# Patient Record
Sex: MALE | Race: WHITE | Employment: FULL TIME | URBAN - METROPOLITAN AREA
[De-identification: names, ages, dates, MRNs, and addresses within clinical notes are randomized per-mention and may not be internally consistent; named-entity substitution may affect disease eponyms.]

---

## 2019-04-17 ENCOUNTER — APPOINTMENT (OUTPATIENT)
Dept: CT IMAGING | Facility: HOSPITAL | Age: 49
End: 2019-04-17
Payer: COMMERCIAL

## 2019-04-17 PROCEDURE — 72125 CT NECK SPINE W/O DYE: CPT

## 2019-04-17 PROCEDURE — 70450 CT HEAD/BRAIN W/O DYE: CPT

## 2019-04-18 NOTE — ED NOTES
Patient arrives saturated in urine, attempting to strike staff. Security at bedside. Patient attempting to pull off c-collar and get out of bed. Patient to CT with security, while in CT, patient striking at staff, yelling at staff, and spitting on staff.  P

## 2019-04-18 NOTE — ED NOTES
Spoke with patient's cousin and father, father Jaguar Mcmillan took patient's pants, belt, underwear and socks which were saturated with urine.  Updated on plan of care, family sent home per MD. Jaguar Costanicole, father, states he will be able to give patient a ride home when he is

## 2019-04-18 NOTE — ED PROVIDER NOTES
Patient Seen in: [facility] Emergency Department    History   Patient presents with:  Alcohol Intoxication (neurologic)    Stated Complaint: etoh    HPI    25-EBKT-AYP alcoholic is in the emergency department by paramedics after he was found having Mucus membranes pink and moist,   no stridor or trismus, dentition is unremarkable    Neck: Supple with normal range of motion. No midline cervical thoracic or lumbosacral spine tenderness. Clavicles and rib cage are nontender.   No lymphadenopathy, n tests on the individual orders.    RAINBOW DRAW BLUE   RAINBOW DRAW LAVENDER   RAINBOW DRAW LIGHT GREEN   RAINBOW DRAW GOLD   CBC W/ DIFFERENTIAL          A total of 60 minutes of critical care time (exclusive of billable procedures) was administered to man abnormality identified. 2. Retained metallic foreign body within the soft tissues of the right upper lip measuring up to 4 mm.     Dictated by: Lizette Burnett MD on 4/17/2019 at 22:55     Approved by: Lizette Burnett MD            Ct Spine Cervical (cpt=7 was screened and evaluated during this visit. As a treating physician attending to the patient, I determined, within reasonable clinical confidence and prior to discharge, that an emergency medical condition was not or was no longer present.   There was n

## 2019-04-18 NOTE — ED INITIAL ASSESSMENT (HPI)
Patient intoxicated, friend states he went out to use washroom and patient fell over. Unsure if he hit head.

## 2019-04-18 NOTE — ED NOTES
Awake @ times still drowsy, kept insisting of calling spouse, reminded about time of day & was advised that call be placed later in the day

## 2019-04-18 NOTE — ED NOTES
More alert & coherent, able to follow direction, nobehavioral issues noted. Ambulatory to washroom with security & staff.  Hard restrained will be d/cd if no further behavioral issues noted

## 2019-04-18 NOTE — ED PROVIDER NOTES
Patient has been resting comfortably throughout the day. At one point he did require Ativan but none for the past 5 to 6 hours. He would still like to talk to someone about alcohol detox.   Crisis evaluation is very backed up but will likely get to him ar

## 2021-07-04 ENCOUNTER — APPOINTMENT (OUTPATIENT)
Dept: CT IMAGING | Facility: HOSPITAL | Age: 51
End: 2021-07-04
Attending: EMERGENCY MEDICINE
Payer: COMMERCIAL

## 2021-07-04 PROCEDURE — 72125 CT NECK SPINE W/O DYE: CPT | Performed by: EMERGENCY MEDICINE

## 2021-07-04 PROCEDURE — 70450 CT HEAD/BRAIN W/O DYE: CPT | Performed by: EMERGENCY MEDICINE

## 2021-07-04 NOTE — ED QUICK NOTES
Patient pulling at C collar, multiple attempts to stop patient unsuccessful.  Pt ripped off C collar and attempted to get out of bed  Staff and security at bedside

## 2021-07-04 NOTE — ED PROVIDER NOTES
Patient Seen in: [facility] Emergency Department      History   Patient presents with:  Eval-D    Stated Complaint: etoh, fell hit head no LOC per EMS     HPI/Subjective:   HPI    Patient is a 62-year-old male, history of alcohol abuse, also rectal Normal voice. Cardiovascular: Normal rate and regular rhythm. Pulmonary/Chest: Effort normal and breath sounds normal. No stridor. No tenderness of the chest wall. There is no crepitus. Abdominal: Soft. There is no tenderness.  There is no guarding noted on EKG Report. Rate: 88  Rhythm: Sinus Rhythm  Reading: Normal sinus rhythm with prolonged QT. Blood work notable for high alcohol level, low bicarb likely from dehydration and alcoholic ketoacidosis.       I have personally visualized t which includes the Dose Index Registry. PATIENT STATED HISTORY: (As transcribed by Technologist)  ETOH, patient fell and hit head, unknown location of head injury. FINDINGS:  Vertebral height is normal.  A fracture is not identified.   Atlantoaxial rela speaking, moving all extremities but not appropriate for discharge. Signed out.                       Disposition and Plan     Clinical Impression:  Alcoholic intoxication without complication ([facility] [address]  (primary encounter diagnosis)  Contusion of head, uns

## 2021-07-04 NOTE — ED QUICK NOTES
Pt breathing through mouth, O2 stats dropped to 85% with a good wave form. Pt placed on non-rebreather at 15L with O2 stats at 99%.

## 2021-07-05 NOTE — ED QUICK NOTES
Contacted wife for ride home, no answer. Per Dr Owen Sammamish pt to sleep until 0600 and contact wife for a ride home.

## 2021-07-05 NOTE — ED QUICK NOTES
Attempted to ambulate patient, unsteady on feet  Pt back in bed, calm and cooperative will try again in a couple of hours

## 2021-07-05 NOTE — ED PROVIDER NOTES
Clinically sober at this time. Ambulating well. Pt's wife at bedside requesting to take patient home. As discussed with Dr. Durga Rivera, pt to be d/c'd when ambulating/clinically sober.

## 2021-09-20 ENCOUNTER — APPOINTMENT (OUTPATIENT)
Dept: CT IMAGING | Facility: HOSPITAL | Age: 51
End: 2021-09-20
Attending: EMERGENCY MEDICINE
Payer: COMMERCIAL

## 2021-09-20 PROCEDURE — 70450 CT HEAD/BRAIN W/O DYE: CPT | Performed by: EMERGENCY MEDICINE

## 2021-09-20 NOTE — ED PROVIDER NOTES
Patient Seen in: [facility] Emergency Department      History   Patient presents with:  Eval-P    Stated Complaint: ETOH    Subjective:   HPI  The patient arrives via EMS from home patient's family states that the patient has been has a drinking pot has never been admitted for a drinking problem. He has never had any DTs. A the patient is in no respiratory distress. HEENT: Atraumatic, conjunctiva are not pale. There is no icterus. Oral mucosa Is wet. No facial trauma. The neck is supple.   No si Abnormality         Status                     ---------                               -----------         ------                     CBC W/ DIFFERENTIAL[[id number]]          Abnormal            Final result                 Please view results fo caliber, stable. Allowing for age this is considered mild generalized atrophy. No new mass effect or midline shift. Stable gray-white matter differentiation. No abnormal extra-axial fluid collection. No acute intracranial hemorrhage.   The visualized p

## 2021-09-20 NOTE — ED INITIAL ASSESSMENT (HPI)
Pt arrives via EMS from home, patient's family states patient has drinking problem and has been drinking today, last drink 2PM per patient. They spoke with Wilfrido Branch, wanted him to be admitted.  When they were transferring patient to the car, patient fell

## 2021-09-20 NOTE — ED QUICK NOTES
Pt placed on 1.5L/min of oxygen via nasal cannula due to low oxygen saturation. Oxygen saturation came up.

## 2021-09-21 NOTE — PROGRESS NOTES
09/21/21 0208   COVID Exposure Risk Screening   Have you been practicing social distancing? Yes   Have you been wearing a mask when in the community?  Yes  (He hasn't had the Covid vaccine.)   Are the people you live with following social distancing and

## 2021-09-21 NOTE — BH LEVEL OF CARE ASSESSMENT
Crisis Evaluation Assessment    Braxton Bi YOB: 1970   Age 45year old MRN [id number]   Location [address] [place] [address] Attending Zepeda Lake, MD      Patient's legal sex: male  Patient identifies as: male  Patient' called [facility] and planned to go there on 9/21 for an assessment. She wants to get him help at [facility] like his brother did. Adelina Shah fell down while on the phone with [facility] and [facility] told her to take him to the ER.      She states he says he's depressed and he cries wh kids  Past Suicidal Ideation: Denies            Family History or Personal Lived Experience of Loss or Near Loss by Suicide: Denies                      Non-Suicidal Self-Injury:   Chuckie Carreno denies hx of SIB            Access to Means:  Access to Means  Has of withdrawal sx's or seizures or hallucinations. He denies drug use. His drug screen was negative. Functional Achievement:   Geri Dos Santos works full-time as an . He denies issues at work.  He states he usually drinks in the afternoon Appropriate  Intensity of Volume: Ordinary  Clarity: Clear  Cognition  Concentration: Unimpaired  Memory: Recent memory intact;  Remote memory intact  Orientation Level: Oriented X4  Insight: Fair  Fair/poor insight as evidenced by: Phyllisse Trotter states he's will retiring and moving out of state or they are passing away from cancer. She states that he used to drink at night before bed but for the past 3 weeks has been drinking during the day. He works full-time as an  and denies issues with his job.  He sta

## 2021-09-21 NOTE — ED QUICK NOTES
Reviewed discharge paperwork with patient, verbalized understanding. Pt is leaving to home with wife, ambulatory with steady gait, in no distress, and is stable at this time.

## 2022-04-14 PROBLEM — F10.20 ALCOHOL USE DISORDER, SEVERE, DEPENDENCE (HCC): Status: ACTIVE | Noted: 2022-04-14

## 2022-04-15 PROBLEM — F41.9 ANXIETY DISORDER, UNSPECIFIED: Status: ACTIVE | Noted: 2022-04-15

## 2022-04-15 PROBLEM — F33.2 MAJOR DEPRESSIVE DISORDER, RECURRENT SEVERE WITHOUT PSYCHOTIC FEATURES (HCC): Status: ACTIVE | Noted: 2022-04-15

## 2022-06-07 ENCOUNTER — APPOINTMENT (OUTPATIENT)
Dept: GENERAL RADIOLOGY | Facility: HOSPITAL | Age: 52
End: 2022-06-07
Attending: EMERGENCY MEDICINE
Payer: MEDICAID

## 2022-06-07 ENCOUNTER — APPOINTMENT (OUTPATIENT)
Dept: CT IMAGING | Facility: HOSPITAL | Age: 52
End: 2022-06-07
Attending: EMERGENCY MEDICINE
Payer: MEDICAID

## 2022-06-07 PROCEDURE — 70450 CT HEAD/BRAIN W/O DYE: CPT | Performed by: EMERGENCY MEDICINE

## 2022-06-07 PROCEDURE — 71045 X-RAY EXAM CHEST 1 VIEW: CPT | Performed by: EMERGENCY MEDICINE

## 2022-06-08 NOTE — ED QUICK NOTES
Patient left home with family.  Left without discharge paperwork after speaking with [facility] staff

## 2022-06-08 NOTE — ED INITIAL ASSESSMENT (HPI)
Pt presented to ED intoxicated. Non responsive. Requiring sternal rubs to arouse.  Prior admission for etoh

## 2022-06-08 NOTE — ED NOTES
Writer attempted assessment with pt, but he declined. Writer performed [employer] which is 0. Pt denied HI/psychosis.

## 2023-01-31 ENCOUNTER — HOSPITAL ENCOUNTER (EMERGENCY)
Facility: HOSPITAL | Age: 53
Discharge: HOME | End: 2023-01-31
Attending: EMERGENCY MEDICINE
Payer: MEDICAID

## 2023-01-31 VITALS
HEART RATE: 99 BPM | DIASTOLIC BLOOD PRESSURE: 86 MMHG | OXYGEN SATURATION: 97 % | RESPIRATION RATE: 17 BRPM | SYSTOLIC BLOOD PRESSURE: 121 MMHG | TEMPERATURE: 99 F

## 2023-01-31 DIAGNOSIS — F10.939 ALCOHOL WITHDRAWAL SYNDROME WITH COMPLICATION (HCC): Primary | ICD-10-CM

## 2023-01-31 DIAGNOSIS — D69.6 THROMBOCYTOPENIA (HCC): ICD-10-CM

## 2023-01-31 DIAGNOSIS — E86.0 DEHYDRATION: ICD-10-CM

## 2023-01-31 LAB
ALBUMIN SERPL-MCNC: 3.6 G/DL (ref 3.4–5)
ALBUMIN/GLOB SERPL: 0.8 {RATIO} (ref 1–2)
ALP LIVER SERPL-CCNC: 82 U/L
ALT SERPL-CCNC: 98 U/L
ANION GAP SERPL CALC-SCNC: 18 MMOL/L (ref 0–18)
AST SERPL-CCNC: 159 U/L (ref 15–37)
BASOPHILS # BLD AUTO: 0.03 X10(3) UL (ref 0–0.2)
BASOPHILS NFR BLD AUTO: 0.4 %
BILIRUB SERPL-MCNC: 1.4 MG/DL (ref 0.1–2)
BUN BLD-MCNC: 10 MG/DL (ref 7–18)
CALCIUM BLD-MCNC: 8.9 MG/DL (ref 8.5–10.1)
CHLORIDE SERPL-SCNC: 99 MMOL/L (ref 98–112)
CO2 SERPL-SCNC: 16 MMOL/L (ref 21–32)
CREAT BLD-MCNC: 0.67 MG/DL
EOSINOPHIL # BLD AUTO: 0.02 X10(3) UL (ref 0–0.7)
EOSINOPHIL NFR BLD AUTO: 0.3 %
ERYTHROCYTE [DISTWIDTH] IN BLOOD BY AUTOMATED COUNT: 14.6 %
ETHANOL SERPL-MCNC: 171 MG/DL (ref ?–3)
GFR SERPLBLD BASED ON 1.73 SQ M-ARVRAT: 112 ML/MIN/1.73M2 (ref 60–?)
GLOBULIN PLAS-MCNC: 4.3 G/DL (ref 2.8–4.4)
GLUCOSE BLD-MCNC: 127 MG/DL (ref 70–99)
GLUCOSE BLD-MCNC: 69 MG/DL (ref 70–99)
HCT VFR BLD AUTO: 42.1 %
HGB BLD-MCNC: 14.8 G/DL
IMM GRANULOCYTES # BLD AUTO: 0.05 X10(3) UL (ref 0–1)
IMM GRANULOCYTES NFR BLD: 0.6 %
LIPASE SERPL-CCNC: 275 U/L (ref 73–393)
LYMPHOCYTES # BLD AUTO: 0.25 X10(3) UL (ref 1–4)
LYMPHOCYTES NFR BLD AUTO: 3.1 %
MAGNESIUM SERPL-MCNC: 1.8 MG/DL (ref 1.6–2.6)
MCH RBC QN AUTO: 30.6 PG (ref 26–34)
MCHC RBC AUTO-ENTMCNC: 35.2 G/DL (ref 31–37)
MCV RBC AUTO: 87 FL
MONOCYTES # BLD AUTO: 0.49 X10(3) UL (ref 0.1–1)
MONOCYTES NFR BLD AUTO: 6.1 %
NEUTROPHILS # BLD AUTO: 7.13 X10 (3) UL (ref 1.5–7.7)
NEUTROPHILS # BLD AUTO: 7.13 X10(3) UL (ref 1.5–7.7)
NEUTROPHILS NFR BLD AUTO: 89.5 %
OSMOLALITY SERPL CALC.SUM OF ELEC: 273 MOSM/KG (ref 275–295)
PLATELET # BLD AUTO: 86 10(3)UL (ref 150–450)
POTASSIUM SERPL-SCNC: 3.7 MMOL/L (ref 3.5–5.1)
PROT SERPL-MCNC: 7.9 G/DL (ref 6.4–8.2)
RBC # BLD AUTO: 4.84 X10(6)UL
SODIUM SERPL-SCNC: 133 MMOL/L (ref 136–145)
WBC # BLD AUTO: 8 X10(3) UL (ref 4–11)

## 2023-01-31 PROCEDURE — 82962 GLUCOSE BLOOD TEST: CPT

## 2023-01-31 PROCEDURE — 80053 COMPREHEN METABOLIC PANEL: CPT | Performed by: EMERGENCY MEDICINE

## 2023-01-31 PROCEDURE — 96361 HYDRATE IV INFUSION ADD-ON: CPT

## 2023-01-31 PROCEDURE — 99285 EMERGENCY DEPT VISIT HI MDM: CPT

## 2023-01-31 PROCEDURE — 85025 COMPLETE CBC W/AUTO DIFF WBC: CPT | Performed by: EMERGENCY MEDICINE

## 2023-01-31 PROCEDURE — 83690 ASSAY OF LIPASE: CPT | Performed by: EMERGENCY MEDICINE

## 2023-01-31 PROCEDURE — 96374 THER/PROPH/DIAG INJ IV PUSH: CPT

## 2023-01-31 PROCEDURE — 83735 ASSAY OF MAGNESIUM: CPT | Performed by: EMERGENCY MEDICINE

## 2023-01-31 PROCEDURE — 99291 CRITICAL CARE FIRST HOUR: CPT

## 2023-01-31 PROCEDURE — 82077 ASSAY SPEC XCP UR&BREATH IA: CPT | Performed by: EMERGENCY MEDICINE

## 2023-01-31 PROCEDURE — 96376 TX/PRO/DX INJ SAME DRUG ADON: CPT

## 2023-01-31 RX ORDER — LORAZEPAM 2 MG/ML
1 INJECTION INTRAMUSCULAR ONCE
Status: DISCONTINUED | OUTPATIENT
Start: 2023-01-31 | End: 2023-01-31

## 2023-01-31 RX ORDER — LORAZEPAM 2 MG/ML
1 INJECTION INTRAMUSCULAR ONCE
Status: COMPLETED | OUTPATIENT
Start: 2023-01-31 | End: 2023-01-31

## 2023-01-31 RX ORDER — LORAZEPAM 1 MG/1
1 TABLET ORAL EVERY 6 HOURS PRN
Qty: 5 TABLET | Refills: 0 | Status: SHIPPED | OUTPATIENT
Start: 2023-01-31 | End: 2023-02-07

## 2023-01-31 NOTE — DISCHARGE INSTRUCTIONS
Please follow up with [facility] ([phone]), 911, or the nearest emergency room if symptoms persist or worsen, or with any questions or concerns. Please also follow up with the referrals below and/or detox as soon as possible for continued treatment. [facility] Suicide Prevention Lifeline  1-201-000-TALK (3671)  Chat service available at www.suicidepreventionlifeline. org    Residential and Outpatient Substance and Alcohol Abuse 5002 [facility] 10   [address] [place], [address] [place]  [phone]    [facility]  [address], [facility] 421  [phone]  -OR-  [address], [address] [place] [address]    [facility]   [address], [address] [place]  [phone]    [facility]  [place] [address]  [phone]    [address]  [phone]    [facility]   [address] 87  [phone]    [facility]   845 [facility], [address]  [phone]    8479 [facility] 143  [address]  [phone] to [address] [place] [address]  [address], Residence Tariq [address]01  [phone]    [facility] [place]   [address] [facility], [facility] [address]  [phone]    [address]  [address] [address], [place], [address] [place] [address]  [phone]    [address]  [address], Yves, [address] [place] [address]  [phone]    Pari Substance Use [address] [facility]  [address] Hendrix, [address]  [phone]    [facility] [address], [address] [place] [address], [address] [place] [address]  [phone]  1310 [facility] [address], [place], [place] [address]  311  [facility]note 3], [facility] 3970  [address]   [address] [place] [address]  [phone]    Top of the [facility]  [address], [place], 175 [facility]  [phone]    Detox (all subject to insurance barriers)    [facility] Naomi  [phone]    [facility]  [address] 5th floor, [facility], 2601 [facility],Fourth Floor  [phone]    4306 [facility]  295 [facility], [facility] 9  [phone]    [facility]  6977 [facility], [facility]  [phone]    [facility]  [address]  [phone]    700 [facility],2Nd Floor [address] [facility] [address] Deejaypolina [place],  [address]  [phone]    [facility] [address] Amaya, [place] [address]  [place] at [facility], 1220 [facility]  [phone]    [facility]  [address] [place] [address]  [phone]  [address] [place] [address] [address]  [phone] at [facility], 300 [facility]  [phone]    [facility]  [address] [place] [address] [address]  [phone]    Top of the [facility]  [address], [place], 175 [facility]  [phone]    [address]  [phone]    Therapy/Counseling Services  Marilyn Cuellar (Psychologist);  Della Hernandez (Therapist)  [facility]  [facility] [address] [place], [place], [address] [phone]    Shelby Barahona (Counselor)  [place] of [address], [address], [address] [phone]    Negro Haines (Therapist)  [address]  [address] [place], [place], [address]  [phone]    Harrison Clark (Therapist)  [address] [place], [place], [address]  [phone]    Anuja Galeano (Therapist)  [facility]  [address] [facility], [address] [place], [place], [address]  [phone]    [facility] (Therapist)  [facility]  4015 [facility], [facility] [address] [place] [address], [address]  [phone]      Psychiatry [address] Department  [address], [place], [place], [address]15Th Floor, [facility] [address] [place] [address], [address], [place], [place], [address], [place], [address]  [phone]    [facility]  [address], [place], [place]  [phone]    [facility]  [address], [place], [address] [phone]

## 2023-01-31 NOTE — ED NOTES
Pt declined consult or consult with  but was open to referrals for detox programs. Pt reported \"possible\" interest in detox programming. Encouraged pt to follow up with [facility], as site does accept pt insurance. Pt confirmed understanding. Also encouraged pt to follow up with this writer if he needs anything further.

## 2023-01-31 NOTE — ED QUICK NOTES
Patient provided with outpatient referrals per  Johanna Nazario. Patient ambulating out of ED with steady gait, states father will be ride home.

## 2023-01-31 NOTE — ED PROVIDER NOTES
Received patient in signout from Dr. Silvano Benitez. He has been resting comfortably, he met with Leopoldo Old crisis worker, they have given him referrals for where he can get help. He feels well enough to go. No SI HI or other issues.

## 2023-01-31 NOTE — ED INITIAL ASSESSMENT (HPI)
Patient brought in by EMS. Per patient about a week ago, he started binge drinking and last drink was around 11am this morning. Earlier in the day pt started experiencing N/V, shakiness, and anxiety. At 10pm there was a stressful event with his daughter that seemed to escalate his symptoms.

## 2023-03-13 ENCOUNTER — HOSPITAL ENCOUNTER (EMERGENCY)
Facility: HOSPITAL | Age: 53
Discharge: HOME | End: 2023-03-13
Attending: EMERGENCY MEDICINE
Payer: MEDICAID

## 2023-03-13 VITALS
SYSTOLIC BLOOD PRESSURE: 139 MMHG | RESPIRATION RATE: 19 BRPM | HEIGHT: 73 IN | OXYGEN SATURATION: 97 % | WEIGHT: 195 LBS | TEMPERATURE: 97 F | DIASTOLIC BLOOD PRESSURE: 93 MMHG | HEART RATE: 109 BPM | BODY MASS INDEX: 25.84 KG/M2

## 2023-03-13 DIAGNOSIS — F10.930 ALCOHOL WITHDRAWAL SYNDROME WITHOUT COMPLICATION (HCC): Primary | ICD-10-CM

## 2023-03-13 LAB
ALBUMIN SERPL-MCNC: 3.6 G/DL (ref 3.4–5)
ALBUMIN/GLOB SERPL: 0.8 {RATIO} (ref 1–2)
ALP LIVER SERPL-CCNC: 91 U/L
AMPHET UR QL SCN: NEGATIVE
ANION GAP SERPL CALC-SCNC: 14 MMOL/L (ref 0–18)
BASOPHILS # BLD AUTO: 0.03 X10(3) UL (ref 0–0.2)
BASOPHILS NFR BLD AUTO: 0.2 %
BENZODIAZ UR QL SCN: NEGATIVE
BILIRUB SERPL-MCNC: 0.8 MG/DL (ref 0.1–2)
BILIRUB UR QL STRIP.AUTO: NEGATIVE
BUN BLD-MCNC: 14 MG/DL (ref 7–18)
CALCIUM BLD-MCNC: 8.5 MG/DL (ref 8.5–10.1)
CANNABINOIDS UR QL SCN: NEGATIVE
CHLORIDE SERPL-SCNC: 102 MMOL/L (ref 98–112)
CLARITY UR REFRACT.AUTO: CLEAR
CO2 SERPL-SCNC: 20 MMOL/L (ref 21–32)
COCAINE UR QL: NEGATIVE
COLOR UR AUTO: YELLOW
CREAT BLD-MCNC: 0.72 MG/DL
CREAT UR-SCNC: 129 MG/DL
EOSINOPHIL # BLD AUTO: 0.01 X10(3) UL (ref 0–0.7)
EOSINOPHIL NFR BLD AUTO: 0.1 %
ERYTHROCYTE [DISTWIDTH] IN BLOOD BY AUTOMATED COUNT: 14.7 %
ETHANOL SERPL-MCNC: 260 MG/DL (ref ?–3)
GFR SERPLBLD BASED ON 1.73 SQ M-ARVRAT: 109 ML/MIN/1.73M2 (ref 60–?)
GLOBULIN PLAS-MCNC: 4.8 G/DL (ref 2.8–4.4)
GLUCOSE BLD-MCNC: 63 MG/DL (ref 70–99)
GLUCOSE UR STRIP.AUTO-MCNC: NEGATIVE MG/DL
HCT VFR BLD AUTO: 46.1 %
HGB BLD-MCNC: 15.9 G/DL
IMM GRANULOCYTES # BLD AUTO: 0.04 X10(3) UL (ref 0–1)
IMM GRANULOCYTES NFR BLD: 0.3 %
KETONES UR STRIP.AUTO-MCNC: 80 MG/DL
LYMPHOCYTES # BLD AUTO: 0.41 X10(3) UL (ref 1–4)
LYMPHOCYTES NFR BLD AUTO: 3.1 %
MCH RBC QN AUTO: 30.7 PG (ref 26–34)
MCHC RBC AUTO-ENTMCNC: 34.5 G/DL (ref 31–37)
MCV RBC AUTO: 89 FL
MDMA UR QL SCN: NEGATIVE
MONOCYTES # BLD AUTO: 0.29 X10(3) UL (ref 0.1–1)
MONOCYTES NFR BLD AUTO: 2.2 %
NEUTROPHILS # BLD AUTO: 12.3 X10 (3) UL (ref 1.5–7.7)
NEUTROPHILS # BLD AUTO: 12.3 X10(3) UL (ref 1.5–7.7)
NEUTROPHILS NFR BLD AUTO: 94.1 %
NITRITE UR QL STRIP.AUTO: NEGATIVE
OPIATES UR QL SCN: NEGATIVE
OSMOLALITY SERPL CALC.SUM OF ELEC: 281 MOSM/KG (ref 275–295)
OXYCODONE UR QL SCN: NEGATIVE
PH UR STRIP.AUTO: 5 [PH] (ref 5–8)
PLATELET # BLD AUTO: 179 10(3)UL (ref 150–450)
PROT SERPL-MCNC: 8.4 G/DL (ref 6.4–8.2)
PROT UR STRIP.AUTO-MCNC: >=500 MG/DL
RBC # BLD AUTO: 5.18 X10(6)UL
SARS-COV-2 RNA RESP QL NAA+PROBE: NOT DETECTED
SODIUM SERPL-SCNC: 136 MMOL/L (ref 136–145)
SP GR UR STRIP.AUTO: 1.02 (ref 1–1.03)
UROBILINOGEN UR STRIP.AUTO-MCNC: <2 MG/DL
WBC # BLD AUTO: 13.1 X10(3) UL (ref 4–11)

## 2023-03-13 PROCEDURE — 80307 DRUG TEST PRSMV CHEM ANLYZR: CPT | Performed by: EMERGENCY MEDICINE

## 2023-03-13 PROCEDURE — 85025 COMPLETE CBC W/AUTO DIFF WBC: CPT | Performed by: EMERGENCY MEDICINE

## 2023-03-13 PROCEDURE — 96374 THER/PROPH/DIAG INJ IV PUSH: CPT | Performed by: EMERGENCY MEDICINE

## 2023-03-13 PROCEDURE — 87086 URINE CULTURE/COLONY COUNT: CPT | Performed by: EMERGENCY MEDICINE

## 2023-03-13 PROCEDURE — 80053 COMPREHEN METABOLIC PANEL: CPT | Performed by: EMERGENCY MEDICINE

## 2023-03-13 PROCEDURE — 99284 EMERGENCY DEPT VISIT MOD MDM: CPT | Performed by: EMERGENCY MEDICINE

## 2023-03-13 PROCEDURE — 82077 ASSAY SPEC XCP UR&BREATH IA: CPT | Performed by: EMERGENCY MEDICINE

## 2023-03-13 PROCEDURE — 96376 TX/PRO/DX INJ SAME DRUG ADON: CPT | Performed by: EMERGENCY MEDICINE

## 2023-03-13 PROCEDURE — 96361 HYDRATE IV INFUSION ADD-ON: CPT | Performed by: EMERGENCY MEDICINE

## 2023-03-13 PROCEDURE — 81001 URINALYSIS AUTO W/SCOPE: CPT | Performed by: EMERGENCY MEDICINE

## 2023-03-13 PROCEDURE — 99285 EMERGENCY DEPT VISIT HI MDM: CPT

## 2023-03-13 RX ORDER — ONDANSETRON 4 MG/1
4 TABLET, ORALLY DISINTEGRATING ORAL ONCE
Status: COMPLETED | OUTPATIENT
Start: 2023-03-13 | End: 2023-03-13

## 2023-03-13 RX ORDER — LORAZEPAM 1 MG/1
1 TABLET ORAL 2 TIMES DAILY PRN
Qty: 20 TABLET | Refills: 0 | Status: SHIPPED | OUTPATIENT
Start: 2023-03-13 | End: 2023-03-20

## 2023-03-13 RX ORDER — LORAZEPAM 1 MG/1
1 TABLET ORAL EVERY 30 MIN PRN
Status: DISCONTINUED | OUTPATIENT
Start: 2023-03-13 | End: 2023-03-13

## 2023-03-13 RX ORDER — LORAZEPAM 2 MG/ML
1 INJECTION INTRAMUSCULAR EVERY 30 MIN PRN
Status: DISCONTINUED | OUTPATIENT
Start: 2023-03-13 | End: 2023-03-13

## 2023-03-13 RX ORDER — LORAZEPAM 2 MG/ML
1 INJECTION INTRAMUSCULAR ONCE
Status: COMPLETED | OUTPATIENT
Start: 2023-03-13 | End: 2023-03-13

## 2023-03-13 NOTE — ED INITIAL ASSESSMENT (HPI)
Pt presents to ed ambulatory with steady gait c/o alcohol withdrawal symptoms, states last drink was yesterday afternoon, states he drinks at least 1 pint of vodka per day.

## 2023-05-21 ENCOUNTER — APPOINTMENT (OUTPATIENT)
Dept: CT IMAGING | Facility: HOSPITAL | Age: 53
End: 2023-05-21
Attending: EMERGENCY MEDICINE
Payer: MEDICAID

## 2023-05-21 PROCEDURE — 70450 CT HEAD/BRAIN W/O DYE: CPT | Performed by: EMERGENCY MEDICINE

## 2023-05-21 PROCEDURE — 72125 CT NECK SPINE W/O DYE: CPT | Performed by: EMERGENCY MEDICINE

## 2023-05-21 PROCEDURE — 76376 3D RENDER W/INTRP POSTPROCES: CPT | Performed by: EMERGENCY MEDICINE

## 2023-05-21 PROCEDURE — 70486 CT MAXILLOFACIAL W/O DYE: CPT | Performed by: EMERGENCY MEDICINE

## 2023-08-02 ENCOUNTER — APPOINTMENT (OUTPATIENT)
Dept: GENERAL RADIOLOGY | Facility: HOSPITAL | Age: 53
End: 2023-08-02
Attending: EMERGENCY MEDICINE
Payer: MEDICAID

## 2023-08-02 ENCOUNTER — HOSPITAL ENCOUNTER (EMERGENCY)
Facility: HOSPITAL | Age: 53
Discharge: HOME | End: 2023-08-03
Attending: EMERGENCY MEDICINE
Payer: MEDICAID

## 2023-08-02 DIAGNOSIS — L02.519 HAND ABSCESS: Primary | ICD-10-CM

## 2023-08-02 DIAGNOSIS — F10.920 ALCOHOLIC INTOXICATION WITHOUT COMPLICATION (HCC): ICD-10-CM

## 2023-08-02 LAB
BASOPHILS # BLD AUTO: 0.02 X10(3) UL (ref 0–0.2)
BASOPHILS NFR BLD AUTO: 0.2 %
EOSINOPHIL # BLD AUTO: 0.02 X10(3) UL (ref 0–0.7)
EOSINOPHIL NFR BLD AUTO: 0.2 %
ERYTHROCYTE [DISTWIDTH] IN BLOOD BY AUTOMATED COUNT: 14 %
HCT VFR BLD AUTO: 40.7 %
HGB BLD-MCNC: 14.2 G/DL
IMM GRANULOCYTES # BLD AUTO: 0.03 X10(3) UL (ref 0–1)
IMM GRANULOCYTES NFR BLD: 0.3 %
LYMPHOCYTES # BLD AUTO: 0.39 X10(3) UL (ref 1–4)
LYMPHOCYTES NFR BLD AUTO: 4.1 %
MCH RBC QN AUTO: 29.8 PG (ref 26–34)
MCHC RBC AUTO-ENTMCNC: 34.9 G/DL (ref 31–37)
MCV RBC AUTO: 85.5 FL
MONOCYTES # BLD AUTO: 0.84 X10(3) UL (ref 0.1–1)
MONOCYTES NFR BLD AUTO: 8.9 %
NEUTROPHILS # BLD AUTO: 8.14 X10 (3) UL (ref 1.5–7.7)
NEUTROPHILS # BLD AUTO: 8.14 X10(3) UL (ref 1.5–7.7)
NEUTROPHILS NFR BLD AUTO: 86.3 %
PLATELET # BLD AUTO: 83 10(3)UL (ref 150–450)
RBC # BLD AUTO: 4.76 X10(6)UL
WBC # BLD AUTO: 9.4 X10(3) UL (ref 4–11)

## 2023-08-02 PROCEDURE — 99285 EMERGENCY DEPT VISIT HI MDM: CPT

## 2023-08-02 PROCEDURE — 96375 TX/PRO/DX INJ NEW DRUG ADDON: CPT

## 2023-08-02 PROCEDURE — 80053 COMPREHEN METABOLIC PANEL: CPT | Performed by: EMERGENCY MEDICINE

## 2023-08-02 PROCEDURE — 0H9FXZZ DRAINAGE OF RIGHT HAND SKIN, EXTERNAL APPROACH: ICD-10-PCS | Performed by: EMERGENCY MEDICINE

## 2023-08-02 PROCEDURE — 80143 DRUG ASSAY ACETAMINOPHEN: CPT | Performed by: EMERGENCY MEDICINE

## 2023-08-02 PROCEDURE — 73130 X-RAY EXAM OF HAND: CPT | Performed by: EMERGENCY MEDICINE

## 2023-08-02 PROCEDURE — 10060 I&D ABSCESS SIMPLE/SINGLE: CPT

## 2023-08-02 PROCEDURE — 80307 DRUG TEST PRSMV CHEM ANLYZR: CPT | Performed by: EMERGENCY MEDICINE

## 2023-08-02 PROCEDURE — 80179 DRUG ASSAY SALICYLATE: CPT | Performed by: EMERGENCY MEDICINE

## 2023-08-02 PROCEDURE — 85025 COMPLETE CBC W/AUTO DIFF WBC: CPT | Performed by: EMERGENCY MEDICINE

## 2023-08-02 PROCEDURE — 82077 ASSAY SPEC XCP UR&BREATH IA: CPT | Performed by: EMERGENCY MEDICINE

## 2023-08-02 PROCEDURE — 96361 HYDRATE IV INFUSION ADD-ON: CPT

## 2023-08-02 PROCEDURE — 90471 IMMUNIZATION ADMIN: CPT

## 2023-08-02 PROCEDURE — 96374 THER/PROPH/DIAG INJ IV PUSH: CPT

## 2023-08-02 RX ORDER — PEPPERMINT OIL
OIL (ML) MISCELLANEOUS
Status: COMPLETED
Start: 2023-08-02 | End: 2023-08-02

## 2023-08-02 RX ORDER — PEPPERMINT OIL
30 OIL (ML) MISCELLANEOUS ONCE
Status: COMPLETED | OUTPATIENT
Start: 2023-08-02 | End: 2023-08-02

## 2023-08-02 RX ORDER — DOXEPIN HYDROCHLORIDE 50 MG/1
1 CAPSULE ORAL DAILY
Status: DISCONTINUED | OUTPATIENT
Start: 2023-08-02 | End: 2023-08-03

## 2023-08-02 RX ORDER — LORAZEPAM 2 MG/ML
1 INJECTION INTRAMUSCULAR ONCE
Status: COMPLETED | OUTPATIENT
Start: 2023-08-02 | End: 2023-08-02

## 2023-08-02 RX ORDER — THIAMINE HYDROCHLORIDE 100 MG/ML
100 INJECTION, SOLUTION INTRAMUSCULAR; INTRAVENOUS ONCE
Status: COMPLETED | OUTPATIENT
Start: 2023-08-02 | End: 2023-08-02

## 2023-08-03 ENCOUNTER — APPOINTMENT (OUTPATIENT)
Dept: CT IMAGING | Facility: HOSPITAL | Age: 53
End: 2023-08-03
Attending: EMERGENCY MEDICINE
Payer: MEDICAID

## 2023-08-03 VITALS
TEMPERATURE: 98 F | DIASTOLIC BLOOD PRESSURE: 78 MMHG | SYSTOLIC BLOOD PRESSURE: 110 MMHG | HEIGHT: 73 IN | RESPIRATION RATE: 16 BRPM | HEART RATE: 89 BPM | WEIGHT: 190 LBS | OXYGEN SATURATION: 99 % | BODY MASS INDEX: 25.18 KG/M2

## 2023-08-03 LAB
ALBUMIN SERPL-MCNC: 3.6 G/DL (ref 3.4–5)
ALBUMIN/GLOB SERPL: 0.9 {RATIO} (ref 1–2)
ALP LIVER SERPL-CCNC: 74 U/L
ALT SERPL-CCNC: 51 U/L
AMPHET UR QL SCN: NEGATIVE
ANION GAP SERPL CALC-SCNC: 9 MMOL/L (ref 0–18)
APAP SERPL-MCNC: <2 UG/ML (ref 10–30)
AST SERPL-CCNC: 70 U/L (ref 15–37)
BENZODIAZ UR QL SCN: NEGATIVE
BILIRUB SERPL-MCNC: 0.7 MG/DL (ref 0.1–2)
BUN BLD-MCNC: 10 MG/DL (ref 7–18)
CALCIUM BLD-MCNC: 8.7 MG/DL (ref 8.5–10.1)
CANNABINOIDS UR QL SCN: NEGATIVE
CHLORIDE SERPL-SCNC: 105 MMOL/L (ref 98–112)
CO2 SERPL-SCNC: 25 MMOL/L (ref 21–32)
COCAINE UR QL: NEGATIVE
CREAT BLD-MCNC: 0.61 MG/DL
CREAT UR-SCNC: 28 MG/DL
EGFRCR SERPLBLD CKD-EPI 2021: 114 ML/MIN/1.73M2 (ref 60–?)
ETHANOL SERPL-MCNC: 484 MG/DL (ref ?–3)
GLOBULIN PLAS-MCNC: 4.2 G/DL (ref 2.8–4.4)
GLUCOSE BLD-MCNC: 102 MG/DL (ref 70–99)
MDMA UR QL SCN: NEGATIVE
OPIATES UR QL SCN: NEGATIVE
OSMOLALITY SERPL CALC.SUM OF ELEC: 287 MOSM/KG (ref 275–295)
OXYCODONE UR QL SCN: NEGATIVE
POTASSIUM SERPL-SCNC: 3.9 MMOL/L (ref 3.5–5.1)
PROT SERPL-MCNC: 7.8 G/DL (ref 6.4–8.2)
SALICYLATES SERPL-MCNC: <1.7 MG/DL (ref 2.8–20)
SODIUM SERPL-SCNC: 139 MMOL/L (ref 136–145)

## 2023-08-03 PROCEDURE — 70450 CT HEAD/BRAIN W/O DYE: CPT | Performed by: EMERGENCY MEDICINE

## 2023-08-03 PROCEDURE — 96365 THER/PROPH/DIAG IV INF INIT: CPT

## 2023-08-03 RX ORDER — SULFAMETHOXAZOLE AND TRIMETHOPRIM 800; 160 MG/1; MG/1
1 TABLET ORAL 2 TIMES DAILY
Qty: 14 TABLET | Refills: 0 | Status: SHIPPED | OUTPATIENT
Start: 2023-08-03 | End: 2023-08-10

## 2023-08-03 NOTE — ED QUICK NOTES
Pt awake and alert. Finishing his breakfast. Coherent and oriented x3. Reevaluated by staff. Ambulatory with steady gait.

## 2023-08-03 NOTE — ED QUICK NOTES
Brought in by wheelchair from triage, highly intoxicated, strong smell of alcohol, accompanied by son, noted with soak urine, very delirious. Here for help with etoh detox & L hand pain & swelling, due to metal injury 2 days ago. Pt cleaned & fresh gown given belongings sent home with son.

## 2023-08-03 NOTE — DISCHARGE INSTRUCTIONS
Detox referrals:  [facility], [facility] [address] [facility], [place], [address], [address] [facility], [place], [address], [address] [place] [address], [address] [place] [address] 30, [facility], [address] [facility], [address]  [address] [facility], [facility], [phone]    Additional resources:  Alcoholics Anonymous at [facility]

## 2023-08-03 NOTE — ED INITIAL ASSESSMENT (HPI)
Swelling  and  infection in right hand for few days . as per son patient is drinking alcohol  since few days  last drink  2 hours ago . Want to go for detox .   Vomited few times complaining of headache  also

## 2023-08-03 NOTE — ED QUICK NOTES
Patient to CT with this PCT. Patient was able to move himself to the CT bed with no issues or assist. Patient has no symptom complaints. Patient back to ED Room, no other needs at this time. Urinal and call light placed within reach.

## 2023-08-03 NOTE — ED NOTES
Met with patient at bedside. Patient stated that he just wants to sleep and go home. Writer asked about evaluation from  and patient stated that he does not want detox treatment at this time. Patient denied SI, HI, AVH, and SIB. Patient stated that when he gets enough rest he will go home. Resources were placed in discharge instructions.

## 2023-08-10 ENCOUNTER — APPOINTMENT (OUTPATIENT)
Dept: GENERAL RADIOLOGY | Facility: HOSPITAL | Age: 53
End: 2023-08-10
Attending: EMERGENCY MEDICINE
Payer: MEDICAID

## 2023-08-10 PROCEDURE — 72110 X-RAY EXAM L-2 SPINE 4/>VWS: CPT | Performed by: EMERGENCY MEDICINE

## 2023-12-31 ENCOUNTER — APPOINTMENT (OUTPATIENT)
Dept: ULTRASOUND IMAGING | Facility: HOSPITAL | Age: 53
End: 2023-12-31
Attending: EMERGENCY MEDICINE
Payer: MEDICAID

## 2023-12-31 PROCEDURE — 93971 EXTREMITY STUDY: CPT | Performed by: EMERGENCY MEDICINE

## 2024-11-17 ENCOUNTER — APPOINTMENT (OUTPATIENT)
Dept: CT IMAGING | Facility: HOSPITAL | Age: 54
End: 2024-11-17
Attending: STUDENT IN AN ORGANIZED HEALTH CARE EDUCATION/TRAINING PROGRAM
Payer: MEDICAID

## 2024-11-17 ENCOUNTER — HOSPITAL ENCOUNTER (EMERGENCY)
Facility: HOSPITAL | Age: 54
Discharge: HOME | End: 2024-11-17
Attending: STUDENT IN AN ORGANIZED HEALTH CARE EDUCATION/TRAINING PROGRAM
Payer: MEDICAID

## 2024-11-17 VITALS
BODY MASS INDEX: 23.66 KG/M2 | WEIGHT: 178.56 LBS | SYSTOLIC BLOOD PRESSURE: 118 MMHG | DIASTOLIC BLOOD PRESSURE: 73 MMHG | TEMPERATURE: 98 F | HEART RATE: 107 BPM | RESPIRATION RATE: 16 BRPM | OXYGEN SATURATION: 98 % | HEIGHT: 73 IN

## 2024-11-17 DIAGNOSIS — S02.401A CLOSED FRACTURE OF MAXILLARY SINUS, INITIAL ENCOUNTER (HCC): Primary | ICD-10-CM

## 2024-11-17 DIAGNOSIS — F10.920 ALCOHOLIC INTOXICATION WITHOUT COMPLICATION (HCC): ICD-10-CM

## 2024-11-17 LAB — ETHANOL SERPL-MCNC: 402 MG/DL (ref ?–3)

## 2024-11-17 PROCEDURE — 99285 EMERGENCY DEPT VISIT HI MDM: CPT

## 2024-11-17 PROCEDURE — 70450 CT HEAD/BRAIN W/O DYE: CPT | Performed by: STUDENT IN AN ORGANIZED HEALTH CARE EDUCATION/TRAINING PROGRAM

## 2024-11-17 PROCEDURE — 36415 COLL VENOUS BLD VENIPUNCTURE: CPT

## 2024-11-17 PROCEDURE — 76377 3D RENDER W/INTRP POSTPROCES: CPT | Performed by: STUDENT IN AN ORGANIZED HEALTH CARE EDUCATION/TRAINING PROGRAM

## 2024-11-17 PROCEDURE — 82077 ASSAY SPEC XCP UR&BREATH IA: CPT | Performed by: STUDENT IN AN ORGANIZED HEALTH CARE EDUCATION/TRAINING PROGRAM

## 2024-11-17 PROCEDURE — 70486 CT MAXILLOFACIAL W/O DYE: CPT | Performed by: STUDENT IN AN ORGANIZED HEALTH CARE EDUCATION/TRAINING PROGRAM

## 2024-11-17 RX ORDER — CEPHALEXIN 500 MG/1
500 CAPSULE ORAL 4 TIMES DAILY
Qty: 40 CAPSULE | Refills: 0 | Status: SHIPPED | OUTPATIENT
Start: 2024-11-17 | End: 2024-11-27
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
## 2024-11-17 NOTE — ED QUICK NOTES
Patient is awake and oriented x4, walks with steady gait. Patient called friend Al whom is going to pick him up in 30 minutes.
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
## 2024-11-17 NOTE — ED INITIAL ASSESSMENT (HPI)
Pt to ED for a fall suffered at home. Pt state that he is drunk and that he had a pint of vodka. Pt states that he hit the back of his head on the dresser. R side of pt face is swollen. Pt does not know if he loss consciousness.

## 2025-07-03 ENCOUNTER — APPOINTMENT (OUTPATIENT)
Dept: CT IMAGING | Facility: HOSPITAL | Age: 55
End: 2025-07-03
Attending: EMERGENCY MEDICINE
Payer: MEDICAID

## 2025-07-03 PROCEDURE — 70450 CT HEAD/BRAIN W/O DYE: CPT | Performed by: EMERGENCY MEDICINE

## 2025-07-03 PROCEDURE — 72125 CT NECK SPINE W/O DYE: CPT | Performed by: EMERGENCY MEDICINE

## (undated) NOTE — ED AVS SNAPSHOT
Christine Mcclain   MRN: [id number]    Department:  [facility] Emergency Department   Date of Visit:  4/17/2019           Disclosure     Insurance plans vary and the physician(s) referred by the ER may not be covered by your plan.  Please contact y tell this physician (or your personal doctor if your instructions are to return to your personal doctor) about any new or lasting problems. The primary care or specialist physician will see patients referred from the [facility] Emergency Department.  Peggy Light